# Patient Record
(demographics unavailable — no encounter records)

---

## 2025-07-16 NOTE — ASSESSMENT
[FreeTextEntry1] : Pt. is a 55-year-old F with PMHx of HTN, HLD and obesity here for follow-up weight has fluctuated due to intermitant use GLP 1 agonists pt last cholesterol 124 hgba1c 4.6 was on ozempic july of 23 lost 50lbs 238 went 189 221 now back to 236   Patient encouraged to continue to lose weight patient will try to find a weight loss drug that is available and that she can afford

## 2025-07-16 NOTE — HISTORY OF PRESENT ILLNESS
[FreeTextEntry1] : Since her last visit, patient has been well taking Wegovy intermitantly due to insurance issues  Activity:   Pt. denies any chest pain, dyspnea, orthopnea, PND, palpitations, lightheadedness, syncope or lower extremity edema.   ================================= Labs/Diagnostics:  2022 Lipid profile: T, TC: 145, HDL: 63, LDL: 46 K/Creat: 4.6/0.82 TSH: 3.48 A1c: 5.9%  Stress EKG (2024): negative for iscehmia; exercised 7:02 minutes, METS 10.10  Echo (2024): 1. Left ventricular cavity is normal in size. Left ventricular wall thickness is normal. Left ventricular systolic function is normal with an ejection fraction of 62 % by Briggs's method of disks. There are no regional wall motion abnormalities seen. 2. Normal left ventricular diastolic function. 3. Normal right ventricular cavity size, with normal wall thickness, and normal systolic function. 4. No significant valvular disease. 5. No pericardial effusion seen.  Stress EKG (2022): 5:56 mins exercise; 7 METs; no ischemic ST EKG changes.   Echo (): normal

## 2025-07-16 NOTE — REASON FOR VISIT
[FreeTextEntry1] : Pt. is a 55-year-old F with PMHx of HTN, HLD and obesity here for follow-up since 5/1/2024

## 2025-07-18 NOTE — HISTORY OF PRESENT ILLNESS
[Home] : at home, [unfilled] , at the time of the visit. [Other Location: e.g. Home (Enter Location, City,State)___] : at [unfilled] [Telehealth (audio & video)] : This visit was provided via telehealth using real-time 2-way audio visual technology. [Verbal consent obtained from patient] : the patient, [unfilled] [FreeTextEntry1] : 56 year old F  with PMHX of HLD, HTN, obesity, prediabetes presents today to establish care for weight loss management, referred by Dr. Erendira Gallardo.   She has struggled with her weight for her entire adult life. She has a history of gastric lap band that was done 20 years ago. She has been seeing a weight los specialist at [facility] for many years and has tried various medications for weight loss including Topamax, Phentermine, Saxenda, Ozempic, Zepbound. She had the best results from Ozempic 3 years ago, she was able to lose 55 lbs and get her weight to 190 lbs, which is the lowest her weight has ever been as an adult. She unforunately had difficulty with supply and had to stop Ozempic. She regained all the weight back. Last year she tried Zepbound and did well up to 7.5 mg however had to discontinue due to cost. Since she again regained her weight and did not tolerate other weight loss medications, she recently restarted Zepbound and is currently paying for the vial out of pocket since the cost has gone down. Since retarting Zepbound she has lost about 10 lbs and hopes to see her weight continue to drop. She is frustrated that she has not been able to get the medications covered by her insurance and though she is able to afford paying out of pocket at this time, she admits that the cost makes the medication difficult to stay on long term. She was recommended for a sleep study by her PCP and geno be meeting with a specialist later in the year to be considered for a sleep study. For exercise she rides her bike everyday and goes on walks.   Current weight 236 lbs and BMI 35.88  kg/m2 Cardiologist: Dr. Erendira Gallardo PCP: Last CMP: 4/21/2022

## 2025-07-18 NOTE — DISCUSSION/SUMMARY
[FreeTextEntry1] : -Dietary and exercise habits reviewed and discussed with over 50% of the visit spent discussing cardiovascular disease, the optimization of risk factors and lifestyle strategies that can be used to achieve this.and drastic/fast weight loss can contribute to lean/muscle mass decrease and be a risk for frailty/fractures. Advised no more than 1-2 lbs weight loss per week and that exercise/strength training will encourage fat loss while sparing lean muscle mass. Additionally, most studies on the medications included people under 60 years of age, so there is some uncertainty about long-term effects for older individual - Current weight 235 lbs; BMI 35.88 kg/m2 and has h/o HLD, HTN, prediabetes - Patient plans to remain on her current dose of Zepbound 7.5 mg. She will continue to have the medication managed by her weight loss spsecialist at [facility] - We had a lengthy discussion about insurance coverage and paying out of pocket. Currently she is paying for the Zepbound vial out of pocket which is more affordable than the auto injector pens. She is planning to get tested for sleep apnea and will be meeting with a specialist in November - We will plan to touch base after her sleep study is completed to determine sleep apnea severity and determine if she may be able to get covered for Zepbound at a later time. She is also planning for calcium CT, echocardiogram, and stress EKG - Encouraged patient to continue healthy exercise and eating habits, focusing on a Mediterranean style of eating w/ more plant based foods in general, and aiming for the recommended 150 minutes per week of moderate physical activity.   Follow up with me in 4-6 months Continue to follow up with Dr. Gallardo  as well